# Patient Record
(demographics unavailable — no encounter records)

---

## 2025-02-07 NOTE — HISTORY OF PRESENT ILLNESS
[FreeTextEntry1] : The patient had a total/partial thyroidectomy for thyroid cancer. She is doing well. Denies any trouble swallowing, neck pain, heat intolerance, palpitations, hoarseness, fatigue. Her weight has increased.  She is taking the Levothyroxine regularly and the Calcium supplements.

## 2025-02-07 NOTE — ASSESSMENT
[FreeTextEntry1] : The patient is clinically euthyroid. The thyroid cancer is stable The TSH is not well suppressed, and the Thyroglobulin is undetectable with negative Antibodies Will raise the dose of Levothyroxine slightly Will repeat the US thyroid Will repeat the blood tests before next visit

## 2025-02-07 NOTE — DATA REVIEWED
[FreeTextEntry1] : The TSH is not well suppressed. The Tg is low with negative antibodies. Her Calcium is normal.

## 2025-02-07 NOTE — PHYSICAL EXAM
[Alert] : alert [No Acute Distress] : no acute distress [No Neck Mass] : no neck mass was observed [No Respiratory Distress] : no respiratory distress [Clear to Auscultation] : lungs were clear to auscultation bilaterally [Normal PMI] : the apical impulse was normal [Normal Rate] : heart rate was normal [No Edema] : no peripheral edema [de-identified] : Surgical scar present

## 2025-04-10 NOTE — HISTORY OF PRESENT ILLNESS
[FreeTextEntry1] : The patient had a total thyroidectomy for thyroid cancer. She is doing well. Denies any trouble swallowing, neck pain, heat intolerance, palpitations, hoarseness, fatigue. Her weight has increased. She is taking the Levothyroxine regularly and the Calcium supplements.

## 2025-04-10 NOTE — PHYSICAL EXAM
[Alert] : alert [No Acute Distress] : no acute distress [No Neck Mass] : no neck mass was observed [No Respiratory Distress] : no respiratory distress [Clear to Auscultation] : lungs were clear to auscultation bilaterally [Normal PMI] : the apical impulse was normal [Normal Rate] : heart rate was normal [No Edema] : no peripheral edema [de-identified] : Surgical scar present

## 2025-04-10 NOTE — ASSESSMENT
[FreeTextEntry1] : The patient is clinically euthyroid. The thyroid cancer is stable The TSH is well suppressed, and the Thyroglobulin is undetectable with negative Antibodies Her calcium was normal Her US thyroid revealed a lymph node with borderline characteristics Advised to see the [facility] Given copies of the results Will repeat the US thyroid in 3 months Will continue the same treatment Will repeat the blood tests before next visit

## 2025-04-10 NOTE — DATA REVIEWED
[FreeTextEntry1] : The TSH was well suppressed and the Free t4 was normal. The thyroglobulin was also well suppressed with undetectable thyroglobulin and negative antibodies. The Calcium was normal. The latest US thyroid revealed a possible lymphadenopathy.

## 2025-07-17 NOTE — DATA REVIEWED
[FreeTextEntry1] : The Free t4 is normal and TSH is slightly over 2 uIU/mL, her Tg was low. The FBS was fine. The LDL-C was slightly elevated. The US thyroid on 3/25 revealed a lymph node without a classic hilus.

## 2025-07-17 NOTE — ASSESSMENT
[FreeTextEntry1] : The patient is clinically euthyroid. The thyroid cancer is stable The TSH is not well suppressed but the Thyroglobulin is undetectable with negative Antibodies Her calcium was normal Will raise the Levothyroxine slightly to 112 mcg po QD She saw Dr. Canela, head and Neck surgeon No FNA biopsy was done Will repeat the US thyroid** The patient will call me back for results Will repeat the blood tests before next visit

## 2025-07-17 NOTE — HISTORY OF PRESENT ILLNESS
[FreeTextEntry1] : The patient had a total thyroidectomy for thyroid cancer at [facility] in 2014. She is doing well. Denies any trouble swallowing, neck pain, heat intolerance, palpitations, hoarseness, fatigue. Her weight has not changed/decreased/increased. She is taking the Levothyroxine regularly and the Calcium supplements. She saw Dr. Canela Head and Neck surgeon. No FNA biopsy was done.

## 2025-07-17 NOTE — PHYSICAL EXAM
[Alert] : alert [No Acute Distress] : no acute distress [No Neck Mass] : no neck mass was observed [No Respiratory Distress] : no respiratory distress [Clear to Auscultation] : lungs were clear to auscultation bilaterally [Normal PMI] : the apical impulse was normal [Normal Rate] : heart rate was normal [No Edema] : no peripheral edema [de-identified] : Surgical scar present